# Patient Record
Sex: MALE
[De-identification: names, ages, dates, MRNs, and addresses within clinical notes are randomized per-mention and may not be internally consistent; named-entity substitution may affect disease eponyms.]

---

## 2019-03-19 ENCOUNTER — HOSPITAL ENCOUNTER (EMERGENCY)
Dept: HOSPITAL 5 - ED | Age: 49
Discharge: HOME | End: 2019-03-19
Payer: SELF-PAY

## 2019-03-19 VITALS — SYSTOLIC BLOOD PRESSURE: 101 MMHG | DIASTOLIC BLOOD PRESSURE: 64 MMHG

## 2019-03-19 DIAGNOSIS — K02.9: Primary | ICD-10-CM

## 2019-03-19 DIAGNOSIS — K05.00: ICD-10-CM

## 2019-03-19 PROCEDURE — 99281 EMR DPT VST MAYX REQ PHY/QHP: CPT

## 2019-03-19 NOTE — EMERGENCY DEPARTMENT REPORT
ED ENT HPI





- General


Chief complaint: Dental/Oral


Stated complaint: INFECTION (GUMS)


Time Seen by Provider: 03/19/19 17:29


Source: patient


Mode of arrival: Ambulatory


Limitations: No Limitations





- History of Present Illness


Initial comments: 





Patient is a very pleasant 48-year-old male who comes to the ER with acute on 

chronic dental caries and gum disease.  He has diffuse mandibular frontal 

gingivitis.  Patient does not have a dentist and his limited resources.





Past medical history 


Mental health only





Home medicines include Seroquel and trazodone





Allergy to Thorazine


MD complaint: tooth pain


-: Gradual





                            __________________________














                            __________________________





 1 - area of pain





Severity: moderate


Quality: aching


Consistency: intermittent


Improves with: none


Worsens with: none


Context- Dental: history of dental caries, trauma (as child)


Associated Symptoms: gum swelling.  denies: fever, cough, toothache, pain with 

swallowing, sore throat, tinnitus, hearing loss, discharge from ear, rhinorrhea





- Related Data


                                  Previous Rx's











 Medication  Instructions  Recorded  Last Taken  Type


 


Amoxicillin 500 mg PO BID #20 capsule 03/19/19 Unknown Rx














ED Dental HPI





- General


Chief complaint: Dental/Oral


Stated complaint: INFECTION (GUMS)


Time Seen by Provider: 03/19/19 17:29


Source: patient


Mode of arrival: Ambulatory


Limitations: No Limitations





- Related Data


                                  Previous Rx's











 Medication  Instructions  Recorded  Last Taken  Type


 


Amoxicillin 500 mg PO BID #20 capsule 03/19/19 Unknown Rx














ED Review of Systems


ROS: 


Stated complaint: INFECTION (GUMS)


Other details as noted in HPI





Comment: All other systems reviewed and negative


Constitutional: denies: chills, fever


Eyes: denies: eye pain


ENT: as per HPI, dental pain.  denies: throat pain


Respiratory: denies: cough


Cardiovascular: denies: palpitations


Endocrine: denies: flushing


Gastrointestinal: denies: abdominal pain


Genitourinary: denies: urgency


Musculoskeletal: denies: back pain


Skin: denies: as per HPI, lesions


Neurological: denies: headache


Psychiatric: denies: anxiety


Hematological/Lymphatic: denies: easy bleeding





ED Past Medical Hx





- Past Medical History


Previous Medical History?: Yes


Additional medical history: mental health





- Surgical History


Past Surgical History?: No





- Family History


Family history: no significant





- Medications


Home Medications: 


                                Home Medications











 Medication  Instructions  Recorded  Confirmed  Last Taken  Type


 


Amoxicillin 500 mg PO BID #20 capsule 03/19/19  Unknown Rx














ED Physical Exam





- General


Limitations: No Limitations


General appearance: alert





- Head


Head exam: Present: atraumatic





- Eye


Eye exam: Present: normal appearance, PERRL


Pupils: Present: normal accommodation





- ENT


ENT exam: Present: normal exam, mucous membranes moist





- Expanded ENT Exam


  ** Expanded


Mouth exam: Present: normal external inspection.  Absent: drooling, trismus, 

muffled voice, tongue normal, tongue elevation


Teeth exam: Present: normal inspection, dental caries





                            __________________________














                            __________________________





 1 - Other (area of pain)





Throat exam: Negative: tonsillar erythema, tonsillomegaly, tonsillar exudate, L 

peritonsillar mass





- Neck


Neck exam: Absent: normal inspection





- Respiratory


Respiratory exam: Present: normal lung sounds bilaterally





- Cardiovascular


Cardiovascular Exam: Present: regular rate





- GI/Abdominal


GI/Abdominal exam: Present: soft





- Extremities Exam


Extremities exam: Present: normal inspection





- Back Exam


Back exam: Present: normal inspection





- Neurological Exam


Neurological exam: Present: alert, oriented X3





ED Medical Decision Making





- Medical Decision Making





simple caries


no abscess


no ludwigs


controlling secretions


taking po


vss without fever





educated on local dental clinics


Critical care attestation.: 


If time is entered above; I have spent that time in minutes in the direct care 

of this critically ill patient, excluding procedure time.








ED Disposition


Clinical Impression: 


 Dental caries, Gingival disease





Disposition: DC-01 TO HOME OR SELFCARE


Is pt being admited?: No


Does the pt Need Aspirin: No


Condition: Stable


Instructions:  Dental Caries (ED), Gingivitis (ED)


Additional Instructions: 


MED AS ORDERED UNTIL GONE


AMOX FREE AT [facility] WELL WITH WATER





MOTRIN OR TYLENOL FOR PAIN OR FEVER





DIET AS TOLERATED





ACTIVITY AS TOLERATED





FOLLOW UP dentist 


referrals below





good oral care





Prescriptions: 


Amoxicillin 500 mg PO BID #20 capsule


Referrals: 


[facility] [Outside] - 3-5 Days


[facility] [Outside] - 3-5 Days


Time of Disposition: 17:32